# Patient Record
(demographics unavailable — no encounter records)

---

## 2024-10-21 NOTE — HISTORY OF PRESENT ILLNESS
[de-identified] :  Edmond Vann is a 61 year old male that presents with right lateral knee pain that started about 5 years ago. Patient has had a meniscus repair done years ago by Dr. Thibodeaux in [place]. Patient states that he feels discomfort when walking, cycling, excerising and doing daily activities.  Patient was referred by a friend.  [ Right Knee  ]   Onset of Knee Symptoms: # 5 years ago   Knee Symptoms: Pain with Activity, Walking, at Rest Loss of Motion, Limping, Grinding / Crunching   Location of Pain: Outer Side of Knee Back of Knee, Front of Knee, In Calf Muscles Outer Side of Lower Leg, Low Back Pain, Buttock, Outer Side   Duration of Pain: Daily, Constant, Pain at Night   Intensity of Pain: Pain Level (0-10):  Moderate,   Character of Pain: Dull, Aching, Stabbing Pain is Getting Worse    Painful Activities: Walking, Walking after Sitting, Running Turning / Twisting, Squatting, Lunges Rising From a Seat Stairs Going: down   Knee History:  Meniscus tear Lateral   Non-Surgical Knee Treatments: Rest, Ice,  Acetaminophen, Physical Therapy, Home Exercise Program,   Surgical Knee History: Knee Surgery in the past Meniscectomy

## 2024-10-21 NOTE — PHYSICAL EXAM
[de-identified] : Right Knee Exam:   Skin: Normal. No erythema, no ecchymosis, no abrasions, no scratches, no tattoos.                  Old Incisions: None. Healed arthroscopy portals.   Knee Joint Swelling:Positive effusion. Mild    Popliteal Swelling: None    Pre-Patella Bursa Swelling: None.   Alignment:           Valgus, Mild  Passively correctable to near neutral.    Knee Joint Line Tenderness: Tender at lateral joint line. Not tender at the medial joint line.   Not tender in the patellofemoral compartment.   Soft Tissue Tenderness: None.   Medial Collateral Ligament Laxity:  Good endpoint.                                                       Normal laxity. Good endpoint.   Lateral Collateral Ligament Laxity: Good endpoint       Lateral opening to varus stress.   Moderate Severe.   ROM Extension: 0 degrees.   ROM Flexion: 120 degrees.       ACL Testing:     Stable ACL. Good Endpoint.                                                                Lachman Test: Negative Anterior Drawer Test: Negative   PCL Testing:     Stable PCL.  Good Endpoint.                                                               Posterior Drawer Test: Negative   Patella Compression Test: Negative   Patellofemoral Crepitus: None.     Patellofemoral Apprehension Testing: Negative.   Patellofemoral Laxity: Normal.   Motor Strength:     Quadriceps strength is 5 out of 5                                                                Hamstring strength is 5 out of 5                                                               Ankle dorsiflexion strength is 5 out of 5                                                              Ankle plantarflexion strength is 5 out of 5   Sensation: Light tough sensation in the lower extremity is grossly normal.                                     Pulses:      Pulses are palpable at the ankle at the Dorsalis Pedis Artery.                                                               Pulses are palpable at the Posterior Tibialis Artery.                                                                 Osteophytes: There are no palpable osteophytes along the knee margins.                                                                  Hip Motion: The patient has full and painless hip range of motion.                                                          Hip Tenderness: The patient has no Greater Trochanteric hip bursa tenderness.                                                                   Lumbar Spine Symptoms: Negative straight leg raise.                                                              Gait: Limping Gait.  Abnormal gait.    Assistive Devices:  None  [de-identified] : X-ray of the Right Knee:  AP Standing View: Lateral joint space loss. Moderate. Severe. Bone on Bone. Medial joint space preserved.  PA Flexion View: Lateral joint space loss. Severe. Bone on Bone. Medial joint space preserved.   Lateral View:  Patellofemoral joint space preserved.  [place] View:  Patellofemoral joint space is preserved. Patellofemoral joint central tracking. Osteophytes seen on lateral femoral condyle  Bilateral Hip to Ankle Standing View: Lateral joint space loss. Moderate. Severe. on the right. Hips: No significant changes

## 2024-10-21 NOTE — REVIEW OF SYSTEMS
[Joint Pain] : joint pain [Joint Stiffness] : joint stiffness [Negative] : Heme/Lymph [FreeTextEntry9] : arthritis [FreeTextEntry4] : ringing in ear, loss of hearing  [FreeTextEntry5] : Stent 6 months ago [FreeTextEntry8] : Kidney cancer with partial nephrectomy

## 2024-10-21 NOTE — HISTORY OF PRESENT ILLNESS
[de-identified] :  Edmond Vann is a 61 year old male that presents with right lateral knee pain that started about 5 years ago. Patient has had a meniscus repair done years ago by Dr. Thibodeaux in [place]. Patient states that he feels discomfort when walking, cycling, excerising and doing daily activities.  Patient was referred by a friend.  [ Right Knee  ]   Onset of Knee Symptoms: # 5 years ago   Knee Symptoms: Pain with Activity, Walking, at Rest Loss of Motion, Limping, Grinding / Crunching   Location of Pain: Outer Side of Knee Back of Knee, Front of Knee, In Calf Muscles Outer Side of Lower Leg, Low Back Pain, Buttock, Outer Side   Duration of Pain: Daily, Constant, Pain at Night   Intensity of Pain: Pain Level (0-10):  Moderate,   Character of Pain: Dull, Aching, Stabbing Pain is Getting Worse    Painful Activities: Walking, Walking after Sitting, Running Turning / Twisting, Squatting, Lunges Rising From a Seat Stairs Going: down   Knee History:  Meniscus tear Lateral   Non-Surgical Knee Treatments: Rest, Ice,  Acetaminophen, Physical Therapy, Home Exercise Program,   Surgical Knee History: Knee Surgery in the past Meniscectomy

## 2024-10-21 NOTE — DISCUSSION/SUMMARY
[de-identified] : Assessment:  Right Knee  - Symptoms: Right knee pain laterally with weightbearing. No medial pain or patellofemoral pain. - Exam: lateral joint line tenderness. Laxity of LCL complex with good endpoint.  - Exam: Intact ACL, PCL, LCL. - Exam: No patellofemoral or Medial joint line tenderness. - Exam: Range of Motion: 0-120 - X-rays: Lateral compartment joint space loss in right knee. Severe, with Bone on Bone on flexion view.  - Diagnosis: Lateral compartment osteoarthritis, right knee. Severe  - Non surgical treatments fail to provide adequate relief of osteoarthritis knee symptoms. - Good to Excellent candidate for Lateral Compartment Partial Knee Replacement, right knee.   Plan:   - The patient will consider a Partial Knee Replacement with Dr. Yousuf Robins Jr. MD. - The side, Right.  - The Compartment, Lateral - Will need to be cleared by cardiologist prior to surgery due to stent this past year in the last 6 months. - He also has had left kidney cancer and partial nephrectomy. - May be looking at the beginnig of next year for this procedure. - The procedure will be Ramon Robotic-Arm Assisted. - The implants will be cemented. - The implants used are "Restoris MCK" implants from [place]. - The materials are Titanium tibial implants, CoCr Alloy femoral implants, Polyethylene tibial bearings. - The patient will discuss surgery scheduling with our coordinator - We provided our information packet to take home,review, and prepare for the preoperative visit. - The patient was given the opportunity to schedule their procedure today, or call us when they are ready. - We will assist the patient with all the preoperative scheduling and planning requirements. - Our next visit will be a preoperative visit just prior to surgery to review the procedure, medical clearance, and answer any questions the patient may have.  I look forward to the opportunity to help this patient with their painful knee condition.    Discussion Partial Knee Replacement:   I had a discussion with the patient regarding the findings of their history, exam and imaging. We discussed the option of Partial Knee Replacement using the precision Ramon Robotic-Arm System. We discussed the indications, my surgical process, my techniques of surgery, the probable post-operative course and instructions, and the risks and benefits of the procedure.   The knee replacement implants used will be "Restoris MCK" implants from [place]. The materials are Titanium tibial components, CoCr Alloy femoral components, and Polyethylene (high density plastic) tibial bearings.  The risks of this procedure include but are not limited to the risks of anesthesia, heart attack, stroke, respiratory complications, wound healing problems, skin blisters, delayed wound healing, infection, bleeding, nerve damage, vessel damage, skin sensory changes next to the incisions, loss of motion, scar tissue formation requiring further treatment, blood clots, heterotopic bone formation, implant wear, implant loosening after surgery, allergic responses to the implant materials, continued pain despite proper implant placement, soft tissue pain, and continued knee swelling.  With partial knee replacements there can be progression of arthritis into the other compartments requiring future surgery, and the possible need for further surgery in the future for scar tissue removal, plastic insert change over time, addition of other compartment replacements, and possible revision to total knee replacement.   Although there are no guarantees to success, I feel that the surgery would be very beneficial and there is an excellent chance for a positive outcome.  The patient had their questions answered to their satisfaction.  The patient understood our discussion that was aided by the use of knee models and implants, and a review of their imaging on the computer screen.  The patient expressed understanding and agreement with the plan.  I look forward to the opportunity to help this patient with their painful knee condition.

## 2024-10-21 NOTE — DISCUSSION/SUMMARY
[de-identified] : Assessment:  Right Knee  - Symptoms: Right knee pain laterally with weightbearing. No medial pain or patellofemoral pain. - Exam: lateral joint line tenderness. Laxity of LCL complex with good endpoint.  - Exam: Intact ACL, PCL, LCL. - Exam: No patellofemoral or Medial joint line tenderness. - Exam: Range of Motion: 0-120 - X-rays: Lateral compartment joint space loss in right knee. Severe, with Bone on Bone on flexion view.  - Diagnosis: Lateral compartment osteoarthritis, right knee. Severe  - Non surgical treatments fail to provide adequate relief of osteoarthritis knee symptoms. - Good to Excellent candidate for Lateral Compartment Partial Knee Replacement, right knee.   Plan:   - The patient will consider a Partial Knee Replacement with Dr. Yousuf Robins Jr. MD. - The side, Right.  - The Compartment, Lateral - Will need to be cleared by cardiologist prior to surgery due to stent this past year in the last 6 months. - He also has had left kidney cancer and partial nephrectomy. - May be looking at the beginnig of next year for this procedure. - The procedure will be Ramon Robotic-Arm Assisted. - The implants will be cemented. - The implants used are "Restoris MCK" implants from [place]. - The materials are Titanium tibial implants, CoCr Alloy femoral implants, Polyethylene tibial bearings. - The patient will discuss surgery scheduling with our coordinator - We provided our information packet to take home,review, and prepare for the preoperative visit. - The patient was given the opportunity to schedule their procedure today, or call us when they are ready. - We will assist the patient with all the preoperative scheduling and planning requirements. - Our next visit will be a preoperative visit just prior to surgery to review the procedure, medical clearance, and answer any questions the patient may have.  I look forward to the opportunity to help this patient with their painful knee condition.    Discussion Partial Knee Replacement:   I had a discussion with the patient regarding the findings of their history, exam and imaging. We discussed the option of Partial Knee Replacement using the precision Ramon Robotic-Arm System. We discussed the indications, my surgical process, my techniques of surgery, the probable post-operative course and instructions, and the risks and benefits of the procedure.   The knee replacement implants used will be "Restoris MCK" implants from [employer]. The materials are Titanium tibial components, CoCr Alloy femoral components, and Polyethylene (high density plastic) tibial bearings.  The risks of this procedure include but are not limited to the risks of anesthesia, heart attack, stroke, respiratory complications, wound healing problems, skin blisters, delayed wound healing, infection, bleeding, nerve damage, vessel damage, skin sensory changes next to the incisions, loss of motion, scar tissue formation requiring further treatment, blood clots, heterotopic bone formation, implant wear, implant loosening after surgery, allergic responses to the implant materials, continued pain despite proper implant placement, soft tissue pain, and continued knee swelling.  With partial knee replacements there can be progression of arthritis into the other compartments requiring future surgery, and the possible need for further surgery in the future for scar tissue removal, plastic insert change over time, addition of other compartment replacements, and possible revision to total knee replacement.   Although there are no guarantees to success, I feel that the surgery would be very beneficial and there is an excellent chance for a positive outcome.  The patient had their questions answered to their satisfaction.  The patient understood our discussion that was aided by the use of knee models and implants, and a review of their imaging on the computer screen.  The patient expressed understanding and agreement with the plan.  I look forward to the opportunity to help this patient with their painful knee condition.

## 2025-01-08 NOTE — DISCUSSION/SUMMARY
[de-identified] : Assessment:  Right Knee: -Right knee pain laterally. -Right knee severe lateral compartment osteoarthritis  -X-rays show severe bone on bone lateral compartment joint space loss in the knee -Intact ACL, PCL -No patellofemoral pain or medial compartment pain. -Failure of non surgical management for osteoarthritis of the knee. -Excellent candidate for Lateral Partial Knee Replacement.   Plan:  1. The patient would like to proceed with Lateral Partial Knee Replacement 2. The side, Right 3. The procedure will be Ramon Robotic Arm Assisted with [place] Restoris MCK cemented implants  4. The patient discussed surgery information details today. 5. We will schedule a Post-Op office visit for after surgery to change dressings and review surgery details. 6. We will Schedule the [employer] Robotic-Arm System with the OR  7. The CT scan with [employer] Knee Protocol with Radiology has been completed. 8. Medical Clearance has been completed. 9. Location will be [facility]. 10. I have had an informed consent discussion of the risks and benefits with the patient & documentation was provided    Discussion Partial Knee Replacement:  I had a discussion with the patient regarding the findings of their history, exam and imaging. We discussed the option of Partial Knee Replacement using the precision Ramon Robotic-Arm System. We discussed the indications, my surgical process, my techniques of surgery, the probable post-operative course and instructions, and the risks and benefits of the procedure.   The knee replacement implants used will be "Restoris MCK" implants from [place]. The materials are Titanium tibial components, CoCr Alloy femoral components, and Polyethylene (high density plastic) tibial bearings.  The risks of this procedure include but are not limited to the risks of anesthesia, heart attack, stroke, respiratory complications, wound healing problems, skin blisters, delayed wound healing, infection, bleeding, nerve damage, vessel damage, skin sensory changes next to the incisions, loss of motion, scar tissue formation requiring further treatment, blood clots, heterotopic bone formation, implant wear, implant loosening after surgery, allergic responses to the implant materials, continued pain despite proper implant placement, soft tissue pain, and continued knee swelling.  With partial knee replacements there can be progression of arthritis into the other compartments requiring future surgery, and the possible need for further surgery in the future for scar tissue removal, plastic insert change over time, addition of other compartment replacements, and possible revision to total knee replacement.  Although there are no guarantees to success, I feel that the surgery would be very beneficial and there is an excellent chance for a positive outcome.  The patient had their questions answered to their satisfaction.  The patient understood our discussion that was aided by the use of knee models and implants, and a review of their imaging on the computer screen.  The patient expressed understanding and agreement with the plan.  I look forward to the opportunity to help this patient with their painful knee condition.

## 2025-01-08 NOTE — DISCUSSION/SUMMARY
[de-identified] : Assessment:  Right Knee: -Right knee pain laterally. -Right knee severe lateral compartment osteoarthritis  -X-rays show severe bone on bone lateral compartment joint space loss in the knee -Intact ACL, PCL -No patellofemoral pain or medial compartment pain. -Failure of non surgical management for osteoarthritis of the knee. -Excellent candidate for Lateral Partial Knee Replacement.   Plan:  1. The patient would like to proceed with Lateral Partial Knee Replacement 2. The side, Right 3. The procedure will be Ramon Robotic Arm Assisted with [place] Restoris MCK cemented implants  4. The patient discussed surgery information details today. 5. We will schedule a Post-Op office visit for after surgery to change dressings and review surgery details. 6. We will Schedule the [employer] Robotic-Arm System with the OR  7. The CT scan with [employer] Knee Protocol with Radiology has been completed. 8. Medical Clearance has been completed. 9. Location will be [facility]. 10. I have had an informed consent discussion of the risks and benefits with the patient & documentation was provided    Discussion Partial Knee Replacement:  I had a discussion with the patient regarding the findings of their history, exam and imaging. We discussed the option of Partial Knee Replacement using the precision Ramon Robotic-Arm System. We discussed the indications, my surgical process, my techniques of surgery, the probable post-operative course and instructions, and the risks and benefits of the procedure.   The knee replacement implants used will be "Restoris MCK" implants from [place]. The materials are Titanium tibial components, CoCr Alloy femoral components, and Polyethylene (high density plastic) tibial bearings.  The risks of this procedure include but are not limited to the risks of anesthesia, heart attack, stroke, respiratory complications, wound healing problems, skin blisters, delayed wound healing, infection, bleeding, nerve damage, vessel damage, skin sensory changes next to the incisions, loss of motion, scar tissue formation requiring further treatment, blood clots, heterotopic bone formation, implant wear, implant loosening after surgery, allergic responses to the implant materials, continued pain despite proper implant placement, soft tissue pain, and continued knee swelling.  With partial knee replacements there can be progression of arthritis into the other compartments requiring future surgery, and the possible need for further surgery in the future for scar tissue removal, plastic insert change over time, addition of other compartment replacements, and possible revision to total knee replacement.  Although there are no guarantees to success, I feel that the surgery would be very beneficial and there is an excellent chance for a positive outcome.  The patient had their questions answered to their satisfaction.  The patient understood our discussion that was aided by the use of knee models and implants, and a review of their imaging on the computer screen.  The patient expressed understanding and agreement with the plan.  I look forward to the opportunity to help this patient with their painful knee condition.

## 2025-01-08 NOTE — HISTORY OF PRESENT ILLNESS
[de-identified] : Edmond Vann is a 61-year-old male being seen for a pre-operative visit for. 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  Chief Complaint / Diagnosis: Lateral Knee Pain / Osteoarthritis Lateral Compartment Planned Procedure: Partial Knee Replacement, Lateral Compartment Extremity Side: Right   Summary of Pre-operative Consultation:   - The patient is a 61-year-old with left lateral knee pain from osteoarthritis. - The patient has had pain for years worsening and impacting function and quality of life. - Non-surgical treatments and non-arthroplasty procedures have not provided sufficient pain or symptom relief from the knee arthritis. - The planned procedure the patient was seen here for today was a lateral partial knee replacement.    ([facility] Robotic Arm Assisted, which is my area of expertise). - The procedure will be performed at [facility] in [place]. - The procedure will be performed as an outpatient. - The medical clearance was completed. - The patient has completed all the steps to prepare for the procedure. - The patient has been cleared for surgery. - We reviewed the knee imaging today and confirmed the physical exam findings. - We discussed procedure details including the benefits and risks and the post-operative process.

## 2025-01-08 NOTE — HISTORY OF PRESENT ILLNESS
[de-identified] : Edmond Vann is a 61-year-old male being seen for a pre-operative visit for. 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  Chief Complaint / Diagnosis: Lateral Knee Pain / Osteoarthritis Lateral Compartment Planned Procedure: Partial Knee Replacement, Lateral Compartment Extremity Side: Right   Summary of Pre-operative Consultation:   - The patient is a 61-year-old with left lateral knee pain from osteoarthritis. - The patient has had pain for years worsening and impacting function and quality of life. - Non-surgical treatments and non-arthroplasty procedures have not provided sufficient pain or symptom relief from the knee arthritis. - The planned procedure the patient was seen here for today was a lateral partial knee replacement.    (Ramon Robotic Arm Assisted, which is my area of expertise). - The procedure will be performed at [facility] in [place]. - The procedure will be performed as an outpatient. - The medical clearance was completed. - The patient has completed all the steps to prepare for the procedure. - The patient has been cleared for surgery. - We reviewed the knee imaging today and confirmed the physical exam findings. - We discussed procedure details including the benefits and risks and the post-operative process.

## 2025-01-08 NOTE — PHYSICAL EXAM
[de-identified] : Right Knee Exam:   Skin: Normal. No erythema, no ecchymosis, no abrasions, no scratches, no tattoos.                  Old Incisions: None. Healed arthroscopy portals.   Knee Joint Swelling: Positive effusion.    Popliteal Swelling: None    Pre-Patella Bursa Swelling: None.   Alignment:           Valgus, Mild  Passively correctable to near neutral.    Knee Joint Line Tenderness: Tender at lateral joint line. Not tender at the medial joint line.   Not tender in the patellofemoral compartment.   Soft Tissue Tenderness: None.   Medial Collateral Ligament Laxity:  Good endpoint.                                                       Normal laxity. Good endpoint.   Lateral Collateral Ligament Laxity: Good endpoint       Lateral opening to varus stress.   Moderate to Severe.   ROM Extension: 0 degrees.   ROM Flexion: 120 degrees.       ACL Testing:     Stable ACL. Good Endpoint.                                                                Lachman Test: Negative Anterior Drawer Test: Negative   PCL Testing:     Stable PCL.  Good Endpoint.                                                               Posterior Drawer Test: Negative   Patella Compression Test: Negative   Patellofemoral Crepitus: None.     Patellofemoral Apprehension Testing: Negative.   Patellofemoral Laxity: Normal.   Motor Strength:     Quadriceps strength is 5 out of 5                                                                Hamstring strength is 5 out of 5                                                               Ankle dorsiflexion strength is 5 out of 5                                                              Ankle plantarflexion strength is 5 out of 5   Sensation: Light tough sensation in the lower extremity is grossly normal.                                     Pulses:      Pulses are palpable at the ankle at the Dorsalis Pedis Artery.                                                               Pulses are palpable at the Posterior Tibialis Artery.                                                                 Osteophytes: There are no palpable osteophytes along the knee margins.                                                                  Hip Motion: The patient has full and painless hip range of motion.                                                          Hip Tenderness: The patient has no Greater Trochanteric hip bursa tenderness.                                                                   Lumbar Spine Symptoms: Negative straight leg raise.                                                              Gait: Limping Gait.  Abnormal gait.    Assistive Devices:  None  [de-identified] : X-ray of the Right Knee:  AP Standing View: Lateral joint space loss. Severe. Bone on Bone. Medial joint space preserved.  PA Flexion View: Lateral joint space loss. Severe. Bone on Bone. Medial joint space preserved.   Lateral View:  Lateral joint space loss. Severe. Bone on Bone. Patellofemoral joint space preserved.  [place] View:  Patellofemoral joint space is preserved. Patellofemoral joint central tracking. Osteophytes seen on lateral femoral condyle  Bilateral Hip to Ankle Standing View: right knee is valgus 6.5 degrees. Left knee is 0 degrees. Lateral joint space loss. Severe. Bone on Bone on the right. Hips: No significant changes

## 2025-01-08 NOTE — PHYSICAL EXAM
[de-identified] : Right Knee Exam:   Skin: Normal. No erythema, no ecchymosis, no abrasions, no scratches, no tattoos.                  Old Incisions: None. Healed arthroscopy portals.   Knee Joint Swelling: Positive effusion.    Popliteal Swelling: None    Pre-Patella Bursa Swelling: None.   Alignment:           Valgus, Mild  Passively correctable to near neutral.    Knee Joint Line Tenderness: Tender at lateral joint line. Not tender at the medial joint line.   Not tender in the patellofemoral compartment.   Soft Tissue Tenderness: None.   Medial Collateral Ligament Laxity:  Good endpoint.                                                       Normal laxity. Good endpoint.   Lateral Collateral Ligament Laxity: Good endpoint       Lateral opening to varus stress.   Moderate to Severe.   ROM Extension: 0 degrees.   ROM Flexion: 120 degrees.       ACL Testing:     Stable ACL. Good Endpoint.                                                                Lachman Test: Negative Anterior Drawer Test: Negative   PCL Testing:     Stable PCL.  Good Endpoint.                                                               Posterior Drawer Test: Negative   Patella Compression Test: Negative   Patellofemoral Crepitus: None.     Patellofemoral Apprehension Testing: Negative.   Patellofemoral Laxity: Normal.   Motor Strength:     Quadriceps strength is 5 out of 5                                                                Hamstring strength is 5 out of 5                                                               Ankle dorsiflexion strength is 5 out of 5                                                              Ankle plantarflexion strength is 5 out of 5   Sensation: Light tough sensation in the lower extremity is grossly normal.                                     Pulses:      Pulses are palpable at the ankle at the Dorsalis Pedis Artery.                                                               Pulses are palpable at the Posterior Tibialis Artery.                                                                 Osteophytes: There are no palpable osteophytes along the knee margins.                                                                  Hip Motion: The patient has full and painless hip range of motion.                                                          Hip Tenderness: The patient has no Greater Trochanteric hip bursa tenderness.                                                                   Lumbar Spine Symptoms: Negative straight leg raise.                                                              Gait: Limping Gait.  Abnormal gait.    Assistive Devices:  None  [de-identified] : X-ray of the Right Knee:  AP Standing View: Lateral joint space loss. Severe. Bone on Bone. Medial joint space preserved.  PA Flexion View: Lateral joint space loss. Severe. Bone on Bone. Medial joint space preserved.   Lateral View:  Lateral joint space loss. Severe. Bone on Bone. Patellofemoral joint space preserved.  [place] View:  Patellofemoral joint space is preserved. Patellofemoral joint central tracking. Osteophytes seen on lateral femoral condyle  Bilateral Hip to Ankle Standing View: right knee is valgus 6.5 degrees. Left knee is 0 degrees. Lateral joint space loss. Severe. Bone on Bone on the right. Hips: No significant changes

## 2025-01-15 NOTE — HISTORY OF PRESENT ILLNESS
[de-identified] : Edmond Vann is a 61-year-old male who presents today for a post op visit.   Post op Day: Day 1 Surgery Type: Partial Knee Arthroplasty  Side of Surgery: Right Date of Surgery: 01.14.25   Pain Level: 3 Assistive Device: Cane  Satisfaction Level: Very Satisfied Activities: Walking [de-identified] : Edmond Vann is a 61-year-old male who presents today for a post op visit. Right Lateral PKA on 01.14.25. He has a little bit of swelling but is feeling great overall. [de-identified] : Right Knee Exam:   Incision: lateral parapatellar incision.   Incision Pin Sites: tibial pin site incisions. femoral pin site incisions.   Skin:  Healthy appearing. No erythema.  No ecchymosis.  No drainage.  Dermabond intact.                  Knee Joint Swelling: No Swelling.                                                             ROM Extension: 0 degrees.    ROM Flexion: 90 degrees.     Medial Collateral Ligament Laxity:  Normal laxity. Good endpoint.   Lateral Collateral Ligament Laxity: Normal laxity. Good endpoint.   Instability: No flexion or extension instability  Anterior Drawer Test: No significant translation. Good endpoint.  Posterior Drawer Test:  Stable with good endpoint   Motor Strength: 	 Quadriceps strength is 5 out of 5  Hamstring strength is 5 out of 5  Ankle dorsiflexion strength is 5 out of 5  Ankle plantarflexion strength is 5 out of 5   Sensation:   Light tough sensation in the lower extremity is grossly normal.   Sensory change is located lateral to incision as expected.   Pulses:  Pulses are palpable at the ankle at the Dorsalis Pedis Artery.   Pulses are palpable at the Posterior Tibialis Artery.                                                                   Gait:   Normal gait.   Assistive Devices: None.  [de-identified] : Post-Op X-Rays Partial Knee Replacement: Examination of the Knee:  Right Views: AP, Lateral, Merchant, Hip to Ankle     AP Standing View: Lateral Partial Knee Replacement in good position. No signs of Loosening. No subsidence. No fracture. Good insert space thickness. Optimal tracking of femoral component on tibial insert.     Lateral View: Lateral Partial Knee Replacement in good position. Fix is anatomic on both femur and tibia. Good Cement fixation.     [place] View: Patellofemoral joint shows central tracking. Lateral implant is seen tracking centrally on the tibial implant in this flexion view.     Bilateral Hip to Ankle Standing View: Knee Alignment is in good overall position. Alignment is 1 degrees Valgus on the Right. Alignment is 0.5 degrees Valgus on the Left.              [de-identified] : Assessment:   - Post Op Day 1 after Lateral Partial Knee Replacement, [facility] Robotic-Assisted. - All components cemented. - X-rays taken and confirmed implants in good position and no complications. - Pain well controlled with blocks still working. Not taking narcotics. - Patient has medications that were prescribed. - Patient was examined and is doing appropriately for POD #1. - Dermabond skin adhesive on incisions is intact. - Incision clean and dry with no drainage. - No signs of infection. - No calf tenderness. Calf is soft. No signs of DVT. - Stable collateral ligament exam and cruciate ligament exam. - Range of Motion is good with extension and 90 degrees of flexion today. - Gait is good with assistive device. - Patient is very satisfied so far, understands and has reviewed our protocols and has had their questions answered.    Plan:   Dressings: -  Dressing changed today. -  Remove and change in one week. Take Photo of incisions and email or text to us for review. -  After 3 weeks, may clean skin adhesive off with rubbing alcohol. -  May shower with dressing on. They are water proof. If it gets wet inside, remove and change dressing. -  When changing dressing each week, you may shower with dressing off, dont scrub over glue, pat dry and replace dressing.     Therapy & Exercises: -  Continue Post op protocol -  Begin home exercise program and PT. -  Full Weight bearing. -  Isometric Quad Sets 3 sets of 10, 3 times a day. -  Quarter squats at counter Sets 3 sets of 10, 3 times a day. -  Ankle pumps throughout the day to improve circulation. -  Walker or crutches for safety until progressed by PT -  Use Knee Cryo cuff cold therapy at all times when not up and walking for the first 10 days. -  Use Knee Cryo cuff cold therapy after exercises, PT and walks for 6 weeks to reduce swelling and discomfort. -  Start Outpatient PT preferably in 10 to 14 days to allow swelling to resolve and incision to heal.   Medications: -  Continue with ASA orally BID for 4 weeks for DVT prophylaxis. -  Finish oral antibiotics prophylaxis. -  Tylenol and Ibuprofen for pain as directed. -  Hydrocodone or alternative provided for moderate to severe pain.   Follow-up: -  Follow-up in 3 weeks.

## 2025-01-15 NOTE — HISTORY OF PRESENT ILLNESS
[de-identified] : Edmond Vann is a 61-year-old male who presents today for a post op visit.   Post op Day: Day 1 Surgery Type: Partial Knee Arthroplasty  Side of Surgery: Right Date of Surgery: 01.14.25   Pain Level: 3 Assistive Device: Cane  Satisfaction Level: Very Satisfied Activities: Walking [de-identified] : Edmond Vann is a 61-year-old male who presents today for a post op visit. Right Lateral PKA on 01.14.25. He has a little bit of swelling but is feeling great overall. [de-identified] : Right Knee Exam:   Incision: lateral parapatellar incision.   Incision Pin Sites: tibial pin site incisions. femoral pin site incisions.   Skin:  Healthy appearing. No erythema.  No ecchymosis.  No drainage.  Dermabond intact.                  Knee Joint Swelling: No Swelling.                                                             ROM Extension: 0 degrees.    ROM Flexion: 90 degrees.     Medial Collateral Ligament Laxity:  Normal laxity. Good endpoint.   Lateral Collateral Ligament Laxity: Normal laxity. Good endpoint.   Instability: No flexion or extension instability  Anterior Drawer Test: No significant translation. Good endpoint.  Posterior Drawer Test:  Stable with good endpoint   Motor Strength: 	 Quadriceps strength is 5 out of 5  Hamstring strength is 5 out of 5  Ankle dorsiflexion strength is 5 out of 5  Ankle plantarflexion strength is 5 out of 5   Sensation:   Light tough sensation in the lower extremity is grossly normal.   Sensory change is located lateral to incision as expected.   Pulses:  Pulses are palpable at the ankle at the Dorsalis Pedis Artery.   Pulses are palpable at the Posterior Tibialis Artery.                                                                   Gait:   Normal gait.   Assistive Devices: None.  [de-identified] : Post-Op X-Rays Partial Knee Replacement: Examination of the Knee:  Right Views: AP, Lateral, Merchant, Hip to Ankle     AP Standing View: Lateral Partial Knee Replacement in good position. No signs of Loosening. No subsidence. No fracture. Good insert space thickness. Optimal tracking of femoral component on tibial insert.     Lateral View: Lateral Partial Knee Replacement in good position. Fix is anatomic on both femur and tibia. Good Cement fixation.     [place] View: Patellofemoral joint shows central tracking. Lateral implant is seen tracking centrally on the tibial implant in this flexion view.     Bilateral Hip to Ankle Standing View: Knee Alignment is in good overall position. Alignment is 1 degrees Valgus on the Right. Alignment is 0.5 degrees Valgus on the Left.              [de-identified] : Assessment:   - Post Op Day 1 after Lateral Partial Knee Replacement, Ramon Robotic-Assisted. - All components cemented. - X-rays taken and confirmed implants in good position and no complications. - Pain well controlled with blocks still working. Not taking narcotics. - Patient has medications that were prescribed. - Patient was examined and is doing appropriately for POD #1. - Dermabond skin adhesive on incisions is intact. - Incision clean and dry with no drainage. - No signs of infection. - No calf tenderness. Calf is soft. No signs of DVT. - Stable collateral ligament exam and cruciate ligament exam. - Range of Motion is good with extension and 90 degrees of flexion today. - Gait is good with assistive device. - Patient is very satisfied so far, understands and has reviewed our protocols and has had their questions answered.    Plan:   Dressings: -  Dressing changed today. -  Remove and change in one week. Take Photo of incisions and email or text to us for review. -  After 3 weeks, may clean skin adhesive off with rubbing alcohol. -  May shower with dressing on. They are water proof. If it gets wet inside, remove and change dressing. -  When changing dressing each week, you may shower with dressing off, dont scrub over glue, pat dry and replace dressing.     Therapy & Exercises: -  Continue Post op protocol -  Begin home exercise program and PT. -  Full Weight bearing. -  Isometric Quad Sets 3 sets of 10, 3 times a day. -  Quarter squats at counter Sets 3 sets of 10, 3 times a day. -  Ankle pumps throughout the day to improve circulation. -  Walker or crutches for safety until progressed by PT -  Use Knee Cryo cuff cold therapy at all times when not up and walking for the first 10 days. -  Use Knee Cryo cuff cold therapy after exercises, PT and walks for 6 weeks to reduce swelling and discomfort. -  Start Outpatient PT preferably in 10 to 14 days to allow swelling to resolve and incision to heal.   Medications: -  Continue with ASA orally BID for 4 weeks for DVT prophylaxis. -  Finish oral antibiotics prophylaxis. -  Tylenol and Ibuprofen for pain as directed. -  Hydrocodone or alternative provided for moderate to severe pain.   Follow-up: -  Follow-up in 3 weeks.

## 2025-01-27 NOTE — DISCUSSION/SUMMARY
[de-identified] : two weeks post op right lateral partial knee here for a wound check Incision has an eccymotic region medial to incision. It is dry, no drainage. mininmal erythema around the incision. No fever or chills.  Plan: Hold off PT for two weeks to allow skin to heal. No stationary bike until skin improves. continue isometric exercises and walking. cold therapy only 3 times a day for 10 to 15 minutes. Send photos next week.

## 2025-01-27 NOTE — HISTORY OF PRESENT ILLNESS
[de-identified] : Edmodn Vann is a 61-year-old male being seen for a post-operative visit for. 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  Extremity Side: Right - The patient is a 61-year-old who had right lateral knee pain from osteoarthritis. - surgery was uneventful - He is here for a wound check today because his incision site had a purple subcutaneous hematoma that I was concerned about.

## 2025-01-27 NOTE — PHYSICAL EXAM
[de-identified] : Right Knee Exam:   Incision: Healing lateral parapatellar incision.   Incision Pin Sites: Healing tibial pin site incisions. Healing femoral pin site incisions.   Skin: . Minimal erythema.  There is a region of ecchymosis medial to the incision about 3-4 cm and along the incision. He said it began after taking his Prosugrel antiplatlet medication he takes and he is taking a baby aspirin. There is No drainage.  Dermabond intact.                 Knee Joint Swelling: Mild effusion.   Alignment: Neutral.                                                          ROM Extension: 0 degrees.    ROM Flexion: 90 degrees.     Medial Collateral Ligament Laxity:  Normal laxity. Good endpoint.   Lateral Collateral Ligament Laxity: Normal laxity. Good endpoint.   Instability: No flexion or extension instability  Anterior Drawer Test: No significant translation. Good endpoint.  Posterior Drawer Test:  Stable with good endpoint   Motor Strength: 	 Quadriceps strength is 5 out of 5  Hamstring strength is 5 out of 5  Ankle dorsiflexion strength is 5 out of 5  Ankle plantarflexion strength is 5 out of 5   Sensation:   Light tough sensation in the lower extremity is grossly normal.   Sensory change is located lateral to incision as expected.   Pulses:  Pulses are palpable at the ankle at the Dorsalis Pedis Artery.   Pulses are palpable at the Posterior Tibialis Artery.                                                                   Gait: Gait improving as expected after surgery.    Assistive Devices: None.

## 2025-02-05 NOTE — HISTORY OF PRESENT ILLNESS
[de-identified] : Edmond Vann is a 61-year-old male who presents today for a post op visit.   Post op Day: 3-Weeks Surgery Type: Partial Knee Arthroplasty  Side of Surgery: Right  Date of Surgery: 01.14.25 at [facility]   Pain Level: 0 Assistive Device: None Satisfaction Level: Very Satisfied  Activities: Walking [de-identified] : Edmond Vann is a 61-year-old male who presents today 3 weeks s/p for Right lateral PKA on 01.14.25 at .  He mentions no pain at the moment just discomfort. He had a dusky appearance to his skin on the photos he sent and it appears he had a reaction to the dermabond with superficial skin blistering and the lateral edge of the incision is dusky but sealed. [de-identified] : Right Knee Exam:   Incision: lateral parapatellar incision reacted to the dermabond and had superficial skin blistering and loss. Now pink and dry medially. the incision edges are sealed buy dusky for 2-4mm but not draining at all and dry. No effusion. the rest of the skin is healthy.  All skin was cleaned with isopropyl alcohol and painted with betadine and a dressing applied.   Incision Pin Sites: Healed tibial pin site incisions. Healed femoral pin site incisions.   Knee Joint Swelling: No Swelling.  Alignment: Neutral.                                                          ROM Extension: 0 degrees.    ROM Flexion: 110 degrees.     Medial Collateral Ligament Laxity:  Normal laxity. Good endpoint.   Lateral Collateral Ligament Laxity: Normal laxity. Good endpoint.   Instability: No flexion or extension instability  Anterior Drawer Test: No significant translation. Good endpoint.  Posterior Drawer Test:  Stable with good endpoint   Motor Strength: 	 Quadriceps strength is 5 out of 5  Hamstring strength is 5 out of 5  Ankle dorsiflexion strength is 5 out of 5  Ankle plantarflexion strength is 5 out of 5   Sensation:   Light tough sensation in the lower extremity is grossly normal.   Sensory change is located lateral to incision as expected.   Pulses:  Pulses are palpable at the ankle at the Dorsalis Pedis Artery.   Pulses are palpable at the Posterior Tibialis Artery.                                                                   Gait: Gait improving as expected after surgery.  Normal gait.   Assistive Devices: None.  [de-identified] : none [de-identified] : 3 weeks post op right lateral pKA No pain. Implants stable Ligament exam stable ROM excellent. Skin healing issue as described but should be okay. Have given instructions on betadine swabing the incision 2-3 times a day to keep it clean and provided dressings. Will followup in 2 weeks and send photos every few days.

## 2025-02-05 NOTE — HISTORY OF PRESENT ILLNESS
[de-identified] : Edmond Vann is a 61-year-old male who presents today for a post op visit.   Post op Day: 3-Weeks Surgery Type: Partial Knee Arthroplasty  Side of Surgery: Right  Date of Surgery: 01.14.25 at [facility]   Pain Level: 0 Assistive Device: None Satisfaction Level: Very Satisfied  Activities: Walking [de-identified] : Edmond Vann is a 61-year-old male who presents today 3 weeks s/p for Right lateral PKA on 01.14.25 at .  He mentions no pain at the moment just discomfort. He had a dusky appearance to his skin on the photos he sent and it appears he had a reaction to the dermabond with superficial skin blistering and the lateral edge of the incision is dusky but sealed. [de-identified] : Right Knee Exam:   Incision: lateral parapatellar incision reacted to the dermabond and had superficial skin blistering and loss. Now pink and dry medially. the incision edges are sealed buy dusky for 2-4mm but not draining at all and dry. No effusion. the rest of the skin is healthy.  All skin was cleaned with isopropyl alcohol and painted with betadine and a dressing applied.   Incision Pin Sites: Healed tibial pin site incisions. Healed femoral pin site incisions.   Knee Joint Swelling: No Swelling.  Alignment: Neutral.                                                          ROM Extension: 0 degrees.    ROM Flexion: 110 degrees.     Medial Collateral Ligament Laxity:  Normal laxity. Good endpoint.   Lateral Collateral Ligament Laxity: Normal laxity. Good endpoint.   Instability: No flexion or extension instability  Anterior Drawer Test: No significant translation. Good endpoint.  Posterior Drawer Test:  Stable with good endpoint   Motor Strength: 	 Quadriceps strength is 5 out of 5  Hamstring strength is 5 out of 5  Ankle dorsiflexion strength is 5 out of 5  Ankle plantarflexion strength is 5 out of 5   Sensation:   Light tough sensation in the lower extremity is grossly normal.   Sensory change is located lateral to incision as expected.   Pulses:  Pulses are palpable at the ankle at the Dorsalis Pedis Artery.   Pulses are palpable at the Posterior Tibialis Artery.                                                                   Gait: Gait improving as expected after surgery.  Normal gait.   Assistive Devices: None.  [de-identified] : none [de-identified] : 3 weeks post op right lateral pKA No pain. Implants stable Ligament exam stable ROM excellent. Skin healing issue as described but should be okay. Have given instructions on betadine swabing the incision 2-3 times a day to keep it clean and provided dressings. Will followup in 2 weeks and send photos every few days.

## 2025-02-12 NOTE — HISTORY OF PRESENT ILLNESS
[de-identified] : Edmond Vann is a 61-year-old male who presents today for a post op visit.  Post op Day: 1 Month Surgery Type: Partial Knee Arthroplasty Side of Surgery: Right Date of Surgery: 01.14.25 at [facility]  Pain Level:  Assistive Device:  Satisfaction Level: Very Satisfied  Activities: Walking.PT [de-identified] : Edmond Vann is a 61-year-old male who presents today 3 weeks s/p for Right lateral PKA on 01.14.25 at .  he is here for a wound check. He has a lateral incision that has dusky edges and the medial side was blistered from a dermabond reaction.   [de-identified] : Right lateral incision. Dusking incision edges improved proximally, still dusky for 2mm distally side to side. blisered area improving. No knee pain with walking.  Ambulating well. knee stable. No effusion. No erythema. No signs of dvt good pulses. good motor strenght of quads and hamstrings.   [de-identified] : Right lateral PKA 1.14.2025. Right lateral incision healing issue after dermabond reaction. Plan: I cleanned the wound and applied a Prevena Vaccumm dressing for one week. We will see him back in a week.

## 2025-02-21 NOTE — HISTORY OF PRESENT ILLNESS
[de-identified] : Edmond Vann is a 61-year-old male who presents today for a post op visit.  Post op Day: 5 Weeks Surgery Type: Partial Knee Arthroplasty Side of Surgery: Right Date of Surgery: 01.14.25 at [facility]  Pain Level: Assistive Device: Satisfaction Level: Very Satisfied  Activities: Walking.PT. [de-identified] : Edmond Vann is a 61-year-old male who presents today 5 Weeks s/p for Right lateral PKA on 01.14.25 at . He is here for a wound check. He has a lateral incision that had dusky incision edges and medial to the incision he had a severe skin reaction to the dermabond. He was placed in a Provena negative pressure dressing last week and is here for a wound check.  [de-identified] : The dressing was removed and was functioning. The skin around the edges of the incision are healthy and granulating well and shirking in width. The incision edges themselves are still dusky about 2mm on either side and are now acting as a scab while the healthy tissue granulates from below and the sides. There is no signs of infection. The knee joint has no effusion. He has no pain with ROM of the knee or walking. He is stable to varus valgus and AP stress testing. Pulses are palppable at the foot and ankle. The rest of his skin is very healthy.   [de-identified] : 5 weeks s/p right lateral PKA with skin healing wound issue. Using Provena negative pressure dressing and significant improvement from last week with healthy skin granulation tissue and wound neville in size.  New Provena applied today after cleaning the incision and will see him back in one week.

## 2025-02-21 NOTE — HISTORY OF PRESENT ILLNESS
[de-identified] : Edmond Vann is a 61-year-old male who presents today for a post op visit.  Post op Day: 5 Weeks Surgery Type: Partial Knee Arthroplasty Side of Surgery: Right Date of Surgery: 01.14.25 at [facility]  Pain Level: Assistive Device: Satisfaction Level: Very Satisfied  Activities: Walking.PT. [de-identified] : Edmond Vann is a 61-year-old male who presents today 5 Weeks s/p for Right lateral PKA on 01.14.25 at . He is here for a wound check. He has a lateral incision that had dusky incision edges and medial to the incision he had a severe skin reaction to the dermabond. He was placed in a Provena negative pressure dressing last week and is here for a wound check.  [de-identified] : The dressing was removed and was functioning. The skin around the edges of the incision are healthy and granulating well and shirking in width. The incision edges themselves are still dusky about 2mm on either side and are now acting as a scab while the healthy tissue granulates from below and the sides. There is no signs of infection. The knee joint has no effusion. He has no pain with ROM of the knee or walking. He is stable to varus valgus and AP stress testing. Pulses are palppable at the foot and ankle. The rest of his skin is very healthy.   [de-identified] : 5 weeks s/p right lateral PKA with skin healing wound issue. Using Provena negative pressure dressing and significant improvement from last week with healthy skin granulation tissue and wound neville in size.  New Provena applied today after cleaning the incision and will see him back in one week.

## 2025-02-25 NOTE — HISTORY OF PRESENT ILLNESS
[de-identified] : OlegEdmond luz is a 61-year-old male who presents today 6 Weeks s/p for Right lateral PKA on 01.14.25 at . He is here for a wound check.

## 2025-02-25 NOTE — HISTORY OF PRESENT ILLNESS
[de-identified] : OlegEdmond luz is a 61-year-old male who presents today 6 Weeks s/p for Right lateral PKA on 01.14.25 at . He is here for a wound check.

## 2025-03-03 NOTE — HISTORY OF PRESENT ILLNESS
[de-identified] : Edmond Vann is a 61-year-old male who presents today for a post op visit.  Post op Day: 5 Weeks Surgery Type: Partial Knee Arthroplasty Side of Surgery: Right Date of Surgery: 01.14.25 at [facility]  Pain Level: Assistive Device: Satisfaction Level: Very Satisfied  Activities: Walking.PT. [de-identified] : Edmond Vann is a 61-year-old male who presents today 7 Weeks s/p for Right lateral PKA on 01.14.25 at . He is here for a wound check. He has a lateral incision that had dusky incision edges and medial to the incision he had a severe skin reaction to the dermabond. He was placed in a second Provena negative pressure dressing and is here for a wound check and preop visit for a wound revision tommorow at [facility]. [de-identified] : The dressing was removed and was functioning. The skin around the edges of the incision are healthy and granulating well and shirking in width. There is no signs of infection. The knee joint had an effusion and the synovial fluid leaked out as the dressing was removed. He has no pain with ROM of the knee or walking. He is stable to varus valgus and AP stress testing. Pulses are palpable at the foot and ankle. The rest of his skin is very healthy and better than last week.   [de-identified] : 7 weeks s/p right lateral PKA with skin healing wound issue. Using Provena negative pressure dressing and significant improvement from last week with healthy skin granulation tissue and wound neville in size.  New Provena applied today after cleaning the incision.  [de-identified] : Will plan to revise the wound tomorrow, Irrigate and debride the wound, possibly change the bearing, then primarily close the wound.   We discussed the risks and benefits of this option and he and his wife agree to proceed. They had their questions answered and were part of the decision making process.

## 2025-03-03 NOTE — HISTORY OF PRESENT ILLNESS
[de-identified] : Edmond Vann is a 61-year-old male who presents today for a post op visit.  Post op Day: 5 Weeks Surgery Type: Partial Knee Arthroplasty Side of Surgery: Right Date of Surgery: 01.14.25 at [facility]  Pain Level: Assistive Device: Satisfaction Level: Very Satisfied  Activities: Walking.PT. [de-identified] : Edmond Vann is a 61-year-old male who presents today 7 Weeks s/p for Right lateral PKA on 01.14.25 at . He is here for a wound check. He has a lateral incision that had dusky incision edges and medial to the incision he had a severe skin reaction to the dermabond. He was placed in a second Provena negative pressure dressing and is here for a wound check and preop visit for a wound revision tommorow at [facility]. [de-identified] : The dressing was removed and was functioning. The skin around the edges of the incision are healthy and granulating well and shirking in width. There is no signs of infection. The knee joint had an effusion and the synovial fluid leaked out as the dressing was removed. He has no pain with ROM of the knee or walking. He is stable to varus valgus and AP stress testing. Pulses are palpable at the foot and ankle. The rest of his skin is very healthy and better than last week.   [de-identified] : 7 weeks s/p right lateral PKA with skin healing wound issue. Using Provena negative pressure dressing and significant improvement from last week with healthy skin granulation tissue and wound neville in size.  New Provena applied today after cleaning the incision.  [de-identified] : Will plan to revise the wound tomorrow, Irrigate and debride the wound, possibly change the bearing, then primarily close the wound.   We discussed the risks and benefits of this option and he and his wife agree to proceed. They had their questions answered and were part of the decision making process.

## 2025-03-03 NOTE — HISTORY OF PRESENT ILLNESS
[de-identified] : Edmond Vann is a 61-year-old male who presents today for a post op visit.  Post op Day: 5 Weeks Surgery Type: Partial Knee Arthroplasty Side of Surgery: Right Date of Surgery: 01.14.25 at [facility]  Pain Level: Assistive Device: Satisfaction Level: Very Satisfied  Activities: Walking.PT. [de-identified] : Edmond Vann is a 61-year-old male who presents today 7 Weeks s/p for Right lateral PKA on 01.14.25 at . He is here for a wound check. He has a lateral incision that had dusky incision edges and medial to the incision he had a severe skin reaction to the dermabond. He was placed in a second Provena negative pressure dressing and is here for a wound check and preop visit for a wound revision tommorow at [facility]. [de-identified] : The dressing was removed and was functioning. The skin around the edges of the incision are healthy and granulating well and shirking in width. There is no signs of infection. The knee joint had an effusion and the synovial fluid leaked out as the dressing was removed. He has no pain with ROM of the knee or walking. He is stable to varus valgus and AP stress testing. Pulses are palpable at the foot and ankle. The rest of his skin is very healthy and better than last week.   [de-identified] : 7 weeks s/p right lateral PKA with skin healing wound issue. Using Provena negative pressure dressing and significant improvement from last week with healthy skin granulation tissue and wound neville in size.  New Provena applied today after cleaning the incision.  [de-identified] : Will plan to revise the wound tomorrow, Irrigate and debride the wound, possibly change the bearing, then primarily close the wound.   We discussed the risks and benefits of this option and he and his wife agree to proceed. They had their questions answered and were part of the decision making process.

## 2025-03-12 NOTE — HISTORY OF PRESENT ILLNESS
[de-identified] : Edmond is a 61-year-old male who presents today for a post op visit.   Post op Day: Day 3 Surgery Type: Wound Revision Side of Surgery: Left Date of Surgery: 3.4.25   Pain Level: ~  ~ [de-identified] : Edmond is a 61-year-old male who presents 3 days s/p for a Left knee wound revision on 3.4.25 at . He is here for a wound check and dressing change  He was in a long leg knee immobilizer to keep his skin from pulling on the closure and to help with wound healing. His Provena dressing was removed. The skin edges were all very healthy with no necrotic edges and no drainage with nylon sutures in place. there is no erythema. Plan:  the wound was cleaned with isopropyl alcohol, then a optifoam dressing was applied. I will see him back in one week for suture removal. The Knee immobilizer has been discontinued. No Formal PT still. Isometric quad sets daily and walking without the brace now.

## 2025-03-12 NOTE — HISTORY OF PRESENT ILLNESS
[de-identified] : Edmond is a 61-year-old male who presents today for a post op visit.   Post op Day: Day 3 Surgery Type: Wound Revision Side of Surgery: Left Date of Surgery: 3.4.25   Pain Level: ~  ~ [de-identified] : Edmond is a 61-year-old male who presents 3 days s/p for a Left knee wound revision on 3.4.25 at . He is here for a wound check and dressing change  He was in a long leg knee immobilizer to keep his skin from pulling on the closure and to help with wound healing. His Provena dressing was removed. The skin edges were all very healthy with no necrotic edges and no drainage with nylon sutures in place. there is no erythema. Plan:  the wound was cleaned with isopropyl alcohol, then a optifoam dressing was applied. I will see him back in one week for suture removal. The Knee immobilizer has been discontinued. No Formal PT still. Isometric quad sets daily and walking without the brace now.

## 2025-03-19 NOTE — PROCEDURE
[de-identified] : Suture removal:  The right lateral parapatellar nylon sutures cleaned with rubbing alcohol and then removed using sterile suture removal tools.  The skin edges were healing nicely with no separation.  Sterile Steri-Strips were then applied.

## 2025-03-19 NOTE — HISTORY OF PRESENT ILLNESS
[de-identified] : Edmond is a 61-year-old male who presents today for a post op visit.  Post op Day: 2 Weeks Surgery Type: Wound Revision Side of Surgery: Left Date of Surgery: 3.4.25 [de-identified] : Edmond is a 61-year-old male who presents 2 Weeks s/p for a Left knee wound revision on 3.4.25 at . He is here for a wound check and dressing change. [de-identified] : Right knee:  The lateral parapatellar incision is healing beautifully with no erythema no ecchymosis.  The nylon sutures have held well the skin edges look very healthy and are nearly healed.  He can fully extend his knee against resistance he is flexing easily to 90 degrees.  He is stable to varus and valgus stress testing.  He has good pulses in the foot.  He can dorsi and plantarflex his ankle against resistance.  He has no effusion. [de-identified] : Impression:  2-week status post right lateral parapatellar wound revision as well as bearing exchange and retinacular closure.  The skin is healing beautifully with no signs of infection and all skin edges are healthy.  Sutures were all removed today and Steri-Strips were applied.  No further dressings were needed.  Plan:  Patient may now continue with weightbearing as tolerated range of motion to tolerance.  Next week he can begin bicycle riding once again.  And we will see him back for follow-up in 2 weeks

## 2025-03-19 NOTE — HISTORY OF PRESENT ILLNESS
[de-identified] : Edmond is a 61-year-old male who presents today for a post op visit.  Post op Day: 2 Weeks Surgery Type: Wound Revision Side of Surgery: Left Date of Surgery: 3.4.25 [de-identified] : Edmond is a 61-year-old male who presents 2 Weeks s/p for a Left knee wound revision on 3.4.25 at . He is here for a wound check and dressing change. [de-identified] : Right knee:  The lateral parapatellar incision is healing beautifully with no erythema no ecchymosis.  The nylon sutures have held well the skin edges look very healthy and are nearly healed.  He can fully extend his knee against resistance he is flexing easily to 90 degrees.  He is stable to varus and valgus stress testing.  He has good pulses in the foot.  He can dorsi and plantarflex his ankle against resistance.  He has no effusion. [de-identified] : Impression:  2-week status post right lateral parapatellar wound revision as well as bearing exchange and retinacular closure.  The skin is healing beautifully with no signs of infection and all skin edges are healthy.  Sutures were all removed today and Steri-Strips were applied.  No further dressings were needed.  Plan:  Patient may now continue with weightbearing as tolerated range of motion to tolerance.  Next week he can begin bicycle riding once again.  And we will see him back for follow-up in 2 weeks

## 2025-03-19 NOTE — PROCEDURE
[de-identified] : Suture removal:  The right lateral parapatellar nylon sutures cleaned with rubbing alcohol and then removed using sterile suture removal tools.  The skin edges were healing nicely with no separation.  Sterile Steri-Strips were then applied.

## 2025-04-02 NOTE — HISTORY OF PRESENT ILLNESS
[de-identified] : Edmond is a 61-year-old male who presents today for a post op visit.  Post op Day: 4 Weeks Surgery Type: Wound Revision Side of Surgery: Left Date of Surgery: 3.4.25.  Surgery Type: Partial Knee Arthroplasty Side of Surgery: Right Date of Surgery: 01.14.25 [de-identified] : Edmond is a 61-year-old male who presents 10 weeks after lateral partial knee replacement on the right knee and 4 Weeks s/p a Left Knee Wound Revision for a wound dehiscence.  He had an excision of the skin wound that had initially not healed well and primary closure with interupted nylon sutures and a provena vacuum dressing, along with a change of the polyethylene insert and reclosure of the retinaculum. He was subsequently placed in a knee immobilizer after the wound repair to protect the tissues initially during the early healing. He is now fully sealed and the wound is good and he is walking well and even feels 90% going down stairs at this point. [de-identified] : Right Knee Exam:   Incision: Healed lateral parapatellar incision. Incision Pin Sites: Healed.   Skin:  Healthy appearing. No erythema.  No ecchymosis.                   Knee Joint Swelling: None   Alignment: Neutral.                                                       ROM Extension: 0 degrees.   ROM Flexion: 105 degrees.     Medial Collateral Ligament Laxity:  Normal laxity. Good endpoint.   Lateral Collateral Ligament Laxity: Normal laxity. Good endpoint.   Instability: No flexion or extension instability   Anterior Drawer Test: No significant translation. Good endpoint. Posterior Drawer Test:  Stable with good endpoint   Motor Strength:   Quadriceps strength is 5 out of 5 Hamstring strength is 5 out of 5 Ankle dorsiflexion strength is 5 out of 5 Ankle plantarflexion strength is 5 out of 5   Sensation:  Light tough sensation in the lower extremity is grossly normal.  Sensory change is located lateral to incision as expected.   Pulses: Pulses are palpable at the ankle at the Dorsalis Pedis Artery.  Pulses are palpable at the Posterior Tibialis Artery.                                                                 Gait: Normal    Assistive Devices: None.  [de-identified] : None today. [de-identified] : 61-year-old male who presents 10 weeks after lateral partial knee replacement on the right knee and 4 Weeks s/p a Left Knee Wound Revision for a wound dehiscence. He had an excision of the skin wound that had initially not healed well and primary closure with interupted nylon sutures and a provena vacuum dressing, along with a change of the polyethylene insert and reclosure of the retinaculum. He was subsequently placed in a knee immobilizer after the wound repair to protect the tissues initially during the early healing. - Wound is now fully healed. - Skin looks great. - No effusion. - No pain walking - Feels stable even on stairs - Restarting his outpatient PT tomorrow.  Plan: Followup in 4 weeks with xrays Work on slowly gaining flexion, no fast flexion exercises with PT to allow skin to stretch. Will go back to work first week in May.

## 2025-05-10 NOTE — HISTORY OF PRESENT ILLNESS
[de-identified] : Edmond is a 62-year-old male who presents today for a post op visit.  Post op Day: 2 months Surgery Type: Wound Revision Side of Surgery: Left Date of Surgery: 3.4.25.  Post-op Day- 3.2 months Surgery Type: Partial Knee Arthroplasty Side of Surgery: Right Date of Surgery: 01.14.25. [de-identified] : Edmond is a 62-year-old male who presents 3 and a half months after lateral partial knee replacement on the right knee and 2 months s/p a Left Knee Wound Revision for a wound dehiscence. He is feeling great. PT is going very well, and he is super happy with his ROM and the wound revision has left his incision looking normal again. [de-identified] : Right Knee: Incision well healed  No signs of infection No drainage. No erythema No ecchymosis No effusion ROM: 0-128 Alignment appears neutral No instability. Good pulses. Good normal knee and ankle strength No calf tenderness, Negative Homans Gait: normal.  [de-identified] : Post-Op X-Rays Partial Knee Replacement: Examination of the Knee: Right Views: AP, Lateral, Merchant, Hip to Ankle     AP Standing View: Lateral Partial Knee Replacement in good position. No signs of Loosening. No subsidence. No fracture. Good insert space thickness. Optimal tracking of femoral component on tibial insert.     Lateral View: Lateral Partial Knee Replacement in good position. Fix is anatomic on both femur and tibia. Good Cement fixation.     [place] View: Patellofemoral joint shows central tracking. Lateral implant is seen tracking centrally on the tibial implant in this flexion view.     Bilateral Hip to Ankle Standing View: Knee Alignment is in good overall position. Alignment is 1 degrees Valgus on the Right. Alignment is 0 degrees Valgus on the Left.              [de-identified] : Assessment:   - Post Op 3 Months after Lateral Partial Knee Replacements, Ramon Robotic-Assisted. Right Knee. 2 months s/p wound revision for dehiscence. -Wound fully healed and looks great. - ROM is great. 0-128. - Cemented components - Incision clean and dry with no drainage. - Incisions well Healed. - No signs of infection. - No calf tenderness. No signs of DVT. - Pain well controlled. Not taking narcotics. - Gait is very good. - Patient has now finished Physical Therapy. - Stable collateral ligament exam and cruciate ligament exam. - Patient is very satisfied with their progress and early outcome today and will continue to work on improving strength and flexibility.   Plan: - Continue to work on stretching and strengthening of surgical leg. - Continue with a lifelong daily home exercise or fitness center program. - Follow-up 6 months after date of surgery with X-rays. Bilateral Standing AP, Lateral, Merchant Views.

## 2025-05-10 NOTE — HISTORY OF PRESENT ILLNESS
[de-identified] : Edmond is a 62-year-old male who presents today for a post op visit.  Post op Day: 2 months Surgery Type: Wound Revision Side of Surgery: Left Date of Surgery: 3.4.25.  Post-op Day- 3.2 months Surgery Type: Partial Knee Arthroplasty Side of Surgery: Right Date of Surgery: 01.14.25. [de-identified] : Edmond is a 62-year-old male who presents 3 and a half months after lateral partial knee replacement on the right knee and 2 months s/p a Left Knee Wound Revision for a wound dehiscence. He is feeling great. PT is going very well, and he is super happy with his ROM and the wound revision has left his incision looking normal again. [de-identified] : Right Knee: Incision well healed  No signs of infection No drainage. No erythema No ecchymosis No effusion ROM: 0-128 Alignment appears neutral No instability. Good pulses. Good normal knee and ankle strength No calf tenderness, Negative Homans Gait: normal.  [de-identified] : Post-Op X-Rays Partial Knee Replacement: Examination of the Knee: Right Views: AP, Lateral, Merchant, Hip to Ankle     AP Standing View: Lateral Partial Knee Replacement in good position. No signs of Loosening. No subsidence. No fracture. Good insert space thickness. Optimal tracking of femoral component on tibial insert.     Lateral View: Lateral Partial Knee Replacement in good position. Fix is anatomic on both femur and tibia. Good Cement fixation.     [place] View: Patellofemoral joint shows central tracking. Lateral implant is seen tracking centrally on the tibial implant in this flexion view.     Bilateral Hip to Ankle Standing View: Knee Alignment is in good overall position. Alignment is 1 degrees Valgus on the Right. Alignment is 0 degrees Valgus on the Left.              [de-identified] : Assessment:   - Post Op 3 Months after Lateral Partial Knee Replacements, Ramon Robotic-Assisted. Right Knee. 2 months s/p wound revision for dehiscence. -Wound fully healed and looks great. - ROM is great. 0-128. - Cemented components - Incision clean and dry with no drainage. - Incisions well Healed. - No signs of infection. - No calf tenderness. No signs of DVT. - Pain well controlled. Not taking narcotics. - Gait is very good. - Patient has now finished Physical Therapy. - Stable collateral ligament exam and cruciate ligament exam. - Patient is very satisfied with their progress and early outcome today and will continue to work on improving strength and flexibility.   Plan: - Continue to work on stretching and strengthening of surgical leg. - Continue with a lifelong daily home exercise or fitness center program. - Follow-up 6 months after date of surgery with X-rays. Bilateral Standing AP, Lateral, Merchant Views.